# Patient Record
Sex: FEMALE | Race: WHITE
[De-identification: names, ages, dates, MRNs, and addresses within clinical notes are randomized per-mention and may not be internally consistent; named-entity substitution may affect disease eponyms.]

---

## 2018-07-15 ENCOUNTER — HOSPITAL ENCOUNTER (EMERGENCY)
Dept: HOSPITAL 53 - M ED | Age: 23
Discharge: HOME | End: 2018-07-15
Payer: COMMERCIAL

## 2018-07-15 DIAGNOSIS — O9A.212: Primary | ICD-10-CM

## 2018-07-15 DIAGNOSIS — S91.332A: ICD-10-CM

## 2018-07-15 DIAGNOSIS — Z3A.25: ICD-10-CM
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
## 2018-07-15 PROCEDURE — 90715 TDAP VACCINE 7 YRS/> IM: CPT

## 2018-07-15 RX ADMIN — TETANUS TOXOID, REDUCED DIPHTHERIA TOXOID AND ACELLULAR PERTUSSIS VACCINE, ADSORBED 1 ML: 5; 2.5; 8; 8; 2.5 SUSPENSION INTRAMUSCULAR at 11:19

## 2018-10-27 ENCOUNTER — HOSPITAL ENCOUNTER (INPATIENT)
Dept: HOSPITAL 53 - M LDO | Age: 23
LOS: 2 days | Discharge: HOME | End: 2018-10-29
Attending: OBSTETRICS & GYNECOLOGY | Admitting: OBSTETRICS & GYNECOLOGY
Payer: COMMERCIAL

## 2018-10-27 DIAGNOSIS — O48.0: Primary | ICD-10-CM

## 2018-10-27 DIAGNOSIS — Z3A.40: ICD-10-CM

## 2018-10-27 LAB
HEMATOCRIT: 40.5 % (ref 36–47)
HEMOGLOBIN: 13.5 G/DL (ref 12–15.5)
MEAN CORPUSCULAR HEMOGLOBIN: 30.3 PG (ref 27–33)
MEAN CORPUSCULAR HGB CONC: 33.3 G/DL (ref 32–36.5)
MEAN CORPUSCULAR VOLUME: 90.8 FL (ref 80–96)
NRBC BLD AUTO-RTO: 0 % (ref 0–0)
PLATELET COUNT, AUTOMATED: 209 10^3/UL (ref 150–450)
RED BLOOD COUNT: 4.46 10^6/UL (ref 4–5.4)
RED CELL DISTRIBUTION WIDTH: 12.9 % (ref 11.5–14.5)
WHITE BLOOD COUNT: 14.2 10^3/UL (ref 4–10)

## 2018-10-28 LAB
CORD GAS ABE A: -12.6
CORD GAS ABE V: -8.1
CORD GAS HCO3 A: 19.3 MEQ/L
CORD GAS HCO3 V: 19.6 MEQ/L
CORD GAS O2 SAT A: 25.6 %
CORD GAS O2 SAT V: 60.8 %
CORD GAS PCO2 A: 69.9 MMHG
CORD GAS PCO2 V: 47.9 MMHG
CORD GAS PH A: 7.06 UNITS
CORD GAS PH V: 7.23 UNITS
CORD GAS PO2 A: 18.3 MMHG
CORD GAS PO2 V: 27 MMHG
CORD GAS SBC A: 13.5 MEQ/L
CORD GAS SBC V: 17.2 MEQ/L
CORD GAS TCO2 A: 21.4 MEQ/L
CORD GAS TCO2 V: 21.1 MEQ/L

## 2018-10-28 PROCEDURE — 0HQ9XZZ REPAIR PERINEUM SKIN, EXTERNAL APPROACH: ICD-10-PCS

## 2018-10-28 PROCEDURE — 10907ZC DRAINAGE OF AMNIOTIC FLUID, THERAPEUTIC FROM PRODUCTS OF CONCEPTION, VIA NATURAL OR ARTIFICIAL OPENING: ICD-10-PCS

## 2018-10-28 RX ADMIN — SODIUM CHLORIDE, POTASSIUM CHLORIDE, SODIUM LACTATE AND CALCIUM CHLORIDE 1 ML: 600; 310; 30; 20 INJECTION, SOLUTION INTRAVENOUS at 00:58

## 2018-10-28 RX ADMIN — SODIUM CHLORIDE, POTASSIUM CHLORIDE, SODIUM LACTATE AND CALCIUM CHLORIDE 1 MLS/HR: 600; 310; 30; 20 INJECTION, SOLUTION INTRAVENOUS at 00:58

## 2018-10-28 RX ADMIN — Medication 1 TAB: at 09:27

## 2018-10-28 RX ADMIN — Medication 1 MG: at 00:54

## 2018-10-28 RX ADMIN — ACETAMINOPHEN 1 MG: 500 TABLET ORAL at 16:46

## 2018-10-29 LAB
HEMATOCRIT: 31.1 % (ref 36–47)
HEMOGLOBIN: 10.2 G/DL (ref 12–15.5)
MEAN CORPUSCULAR HEMOGLOBIN: 30.3 PG (ref 27–33)
MEAN CORPUSCULAR HGB CONC: 32.8 G/DL (ref 32–36.5)
MEAN CORPUSCULAR VOLUME: 92.3 FL (ref 80–96)
NRBC BLD AUTO-RTO: 0 % (ref 0–0)
PLATELET COUNT, AUTOMATED: 123 10^3/UL (ref 150–450)
RED BLOOD COUNT: 3.37 10^6/UL (ref 4–5.4)
RED CELL DISTRIBUTION WIDTH: 13.2 % (ref 11.5–14.5)
SYPHILIS: NONREACTIVE
WHITE BLOOD COUNT: 11.9 10^3/UL (ref 4–10)

## 2018-10-29 RX ADMIN — HUMAN RHO(D) IMMUNE GLOBULIN 1 MCG: 300 INJECTION, SOLUTION INTRAMUSCULAR at 09:18

## 2018-10-29 RX ADMIN — Medication 1 TAB: at 09:18

## 2018-10-29 RX ADMIN — MEASLES, MUMPS, AND RUBELLA VIRUS VACCINE LIVE 1 ML: 1000; 12500; 1000 INJECTION, POWDER, LYOPHILIZED, FOR SUSPENSION SUBCUTANEOUS at 09:19

## 2018-10-29 RX ADMIN — IBUPROFEN 1 MG: 800 TABLET, FILM COATED ORAL at 01:09

## 2018-10-29 RX ADMIN — IBUPROFEN 1 MG: 800 TABLET, FILM COATED ORAL at 11:18

## 2019-09-27 ENCOUNTER — HOSPITAL ENCOUNTER (OUTPATIENT)
Dept: HOSPITAL 53 - M LAB REF | Age: 24
End: 2019-09-27
Attending: UROLOGY
Payer: COMMERCIAL

## 2019-09-27 DIAGNOSIS — N39.46: ICD-10-CM

## 2019-09-27 DIAGNOSIS — N32.81: Primary | ICD-10-CM

## 2019-09-27 DIAGNOSIS — R39.15: ICD-10-CM

## 2019-09-27 LAB
APPEARANCE UR: (no result)
BACTERIA UR QL AUTO: NEGATIVE
BILIRUB UR QL STRIP.AUTO: NEGATIVE
CAOX CRY URNS QL MICRO: (no result)
GLUCOSE UR QL STRIP.AUTO: NEGATIVE MG/DL
HGB UR QL STRIP.AUTO: NEGATIVE
KETONES UR QL STRIP.AUTO: (no result) MG/DL
LEUKOCYTE ESTERASE UR QL STRIP.AUTO: NEGATIVE
MUCOUS THREADS URNS QL MICRO: (no result)
NITRITE UR QL STRIP.AUTO: NEGATIVE
PH UR STRIP.AUTO: 5 UNITS (ref 5–9)
PROT UR QL STRIP.AUTO: NEGATIVE MG/DL
RBC # UR AUTO: 2 /HPF (ref 0–3)
SP GR UR STRIP.AUTO: 1.02 (ref 1–1.03)
SQUAMOUS #/AREA URNS AUTO: 3 /HPF (ref 0–6)
UROBILINOGEN UR QL STRIP.AUTO: 0.2 MG/DL (ref 0–2)
WBC #/AREA URNS AUTO: 0 /HPF (ref 0–3)

## 2020-03-06 ENCOUNTER — HOSPITAL ENCOUNTER (OUTPATIENT)
Dept: HOSPITAL 53 - M LABDRAW1 | Age: 25
End: 2020-03-06
Attending: PHYSICAL MEDICINE & REHABILITATION
Payer: COMMERCIAL

## 2020-03-06 DIAGNOSIS — Z01.812: Primary | ICD-10-CM

## 2020-03-06 DIAGNOSIS — M43.16: ICD-10-CM

## 2020-03-06 LAB
APTT BLD: 27.8 SECONDS (ref 25–38.4)
B-HCG SERPL QL: NEGATIVE
BASOPHILS # BLD AUTO: 0.1 10^3/UL (ref 0–0.2)
BASOPHILS NFR BLD AUTO: 0.6 % (ref 0–1)
CRP SERPL-MCNC: < 0.3 MG/DL (ref 0–0.3)
EOSINOPHIL # BLD AUTO: 0 10^3/UL (ref 0–0.5)
EOSINOPHIL NFR BLD AUTO: 0.4 % (ref 0–3)
ERYTHROCYTE [SEDIMENTATION RATE] IN BLOOD BY WESTERGREN METHOD: 2 MM/HR (ref 0–20)
HCT VFR BLD AUTO: 43.1 % (ref 36–47)
HGB BLD-MCNC: 14 G/DL (ref 12–15.5)
INR PPP: 0.98
LYMPHOCYTES # BLD AUTO: 2 10^3/UL (ref 1.5–5)
LYMPHOCYTES NFR BLD AUTO: 25.6 % (ref 24–44)
MCH RBC QN AUTO: 29.4 PG (ref 27–33)
MCHC RBC AUTO-ENTMCNC: 32.5 G/DL (ref 32–36.5)
MCV RBC AUTO: 90.4 FL (ref 80–96)
MONOCYTES # BLD AUTO: 0.5 10^3/UL (ref 0–0.8)
MONOCYTES NFR BLD AUTO: 6 % (ref 0–5)
NEUTROPHILS # BLD AUTO: 5.3 10^3/UL (ref 1.5–8.5)
NEUTROPHILS NFR BLD AUTO: 67.1 % (ref 36–66)
PLATELET # BLD AUTO: 279 10^3/UL (ref 150–450)
PROTHROMBIN TIME: 12.7 SECONDS (ref 11.8–14)
RBC # BLD AUTO: 4.77 10^6/UL (ref 4–5.4)
WBC # BLD AUTO: 7.9 10^3/UL (ref 4–10)

## 2020-09-04 ENCOUNTER — HOSPITAL ENCOUNTER (EMERGENCY)
Dept: HOSPITAL 53 - M ED | Age: 25
Discharge: HOME | End: 2020-09-04
Payer: COMMERCIAL

## 2020-09-04 VITALS — WEIGHT: 170.64 LBS | HEIGHT: 64 IN | BODY MASS INDEX: 29.13 KG/M2

## 2020-09-04 VITALS — DIASTOLIC BLOOD PRESSURE: 70 MMHG | SYSTOLIC BLOOD PRESSURE: 128 MMHG

## 2020-09-04 DIAGNOSIS — E28.2: ICD-10-CM

## 2020-09-04 DIAGNOSIS — N83.01: Primary | ICD-10-CM

## 2020-09-04 LAB
ALBUMIN SERPL BCG-MCNC: 4.1 GM/DL (ref 3.2–5.2)
ALT SERPL W P-5'-P-CCNC: 39 U/L (ref 12–78)
BASOPHILS # BLD AUTO: 0.1 10^3/UL (ref 0–0.2)
BASOPHILS NFR BLD AUTO: 0.6 % (ref 0–1)
BILIRUB CONJ SERPL-MCNC: 0.2 MG/DL (ref 0–0.2)
BILIRUB SERPL-MCNC: 0.6 MG/DL (ref 0.2–1)
BUN SERPL-MCNC: 14 MG/DL (ref 7–18)
CALCIUM SERPL-MCNC: 9.1 MG/DL (ref 8.5–10.1)
CHLORIDE SERPL-SCNC: 108 MEQ/L (ref 98–107)
CO2 SERPL-SCNC: 27 MEQ/L (ref 21–32)
CREAT SERPL-MCNC: 0.8 MG/DL (ref 0.55–1.3)
EOSINOPHIL # BLD AUTO: 0.1 10^3/UL (ref 0–0.5)
EOSINOPHIL NFR BLD AUTO: 1.6 % (ref 0–3)
GFR SERPL CREATININE-BSD FRML MDRD: > 60 ML/MIN/{1.73_M2} (ref 60–?)
GLUCOSE SERPL-MCNC: 89 MG/DL (ref 70–100)
HCT VFR BLD AUTO: 41.9 % (ref 36–47)
HGB BLD-MCNC: 13.7 G/DL (ref 12–15.5)
LIPASE SERPL-CCNC: 123 U/L (ref 73–393)
LYMPHOCYTES # BLD AUTO: 2.2 10^3/UL (ref 1.5–5)
LYMPHOCYTES NFR BLD AUTO: 26.5 % (ref 24–44)
MCH RBC QN AUTO: 30.6 PG (ref 27–33)
MCHC RBC AUTO-ENTMCNC: 32.7 G/DL (ref 32–36.5)
MCV RBC AUTO: 93.5 FL (ref 80–96)
MONOCYTES # BLD AUTO: 0.6 10^3/UL (ref 0–0.8)
MONOCYTES NFR BLD AUTO: 6.8 % (ref 0–5)
NEUTROPHILS # BLD AUTO: 5.3 10^3/UL (ref 1.5–8.5)
NEUTROPHILS NFR BLD AUTO: 64.3 % (ref 36–66)
PLATELET # BLD AUTO: 257 10^3/UL (ref 150–450)
POTASSIUM SERPL-SCNC: 4.5 MEQ/L (ref 3.5–5.1)
PROT SERPL-MCNC: 7.1 GM/DL (ref 6.4–8.2)
RBC # BLD AUTO: 4.48 10^6/UL (ref 4–5.4)
SODIUM SERPL-SCNC: 139 MEQ/L (ref 136–145)
WBC # BLD AUTO: 8.2 10^3/UL (ref 4–10)

## 2020-09-04 PROCEDURE — 36415 COLL VENOUS BLD VENIPUNCTURE: CPT

## 2020-09-04 PROCEDURE — 99284 EMERGENCY DEPT VISIT MOD MDM: CPT

## 2020-09-04 PROCEDURE — 83690 ASSAY OF LIPASE: CPT

## 2020-09-04 PROCEDURE — 85025 COMPLETE CBC W/AUTO DIFF WBC: CPT

## 2020-09-04 PROCEDURE — 81001 URINALYSIS AUTO W/SCOPE: CPT

## 2020-09-04 PROCEDURE — 84702 CHORIONIC GONADOTROPIN TEST: CPT

## 2020-09-04 PROCEDURE — 93976 VASCULAR STUDY: CPT

## 2020-09-04 PROCEDURE — 80076 HEPATIC FUNCTION PANEL: CPT

## 2020-09-04 PROCEDURE — 76856 US EXAM PELVIC COMPLETE: CPT

## 2020-09-04 PROCEDURE — 80048 BASIC METABOLIC PNL TOTAL CA: CPT

## 2020-09-04 NOTE — REPVR
PROCEDURE INFORMATION: 

Exam: US Nonobstetric Pelvis; Complete 

Exam date and time: 9/4/2020 9:12 PM 

Age: 25 years old 

Clinical indication: Pelvic pain; Additional info: HX of ovarian cyst lower abd 

pain 



TECHNIQUE: 

Imaging protocol: Transabdominal pelvic nonobstetric ultrasound. Complete exam. 

Real time ultrasound with image documentation. 



COMPARISON: 

No relevant prior studies available. 



FINDINGS: 

Uterus/cervix: The uterus measures 7.1 cm in its cephalocaudad dimension and 

3.7 x 4.5 cm in its AP and lateral dimensions. The endometrium measures 6-7 mm. 

Right adnexa: The right ovary measures 4.2 x 3.0 x 4.2 cm and demonstrates a 

simple appearing cyst measuring 3.4 x 2.6 x 3.8 cm. There is right ovarian 

blood flow. 

Left adnexa: The left ovary measures 2.2 x 3.6 x 1.5 cm and demonstrates normal 

blood flow. 

Intraperitoneal space: None. 

Bladder: Normal. 



IMPRESSION: 

1. Right ovarian simple cyst measuring 3.4 x 2.6 x 3.8 cm. 

2. Otherwise negative pelvic sonogram. 



Electronically signed by: Stephen Irziarry On 09/04/2020  21:29:57 PM

## 2021-05-26 ENCOUNTER — HOSPITAL ENCOUNTER (OUTPATIENT)
Dept: HOSPITAL 53 - M OPP | Age: 26
Discharge: HOME | End: 2021-05-26
Attending: INTERNAL MEDICINE
Payer: COMMERCIAL

## 2021-05-26 VITALS — WEIGHT: 170.8 LBS | BODY MASS INDEX: 29.16 KG/M2 | HEIGHT: 64 IN

## 2021-05-26 VITALS — SYSTOLIC BLOOD PRESSURE: 137 MMHG | DIASTOLIC BLOOD PRESSURE: 86 MMHG

## 2021-05-26 DIAGNOSIS — R10.30: ICD-10-CM

## 2021-05-26 DIAGNOSIS — Z80.0: ICD-10-CM

## 2021-05-26 DIAGNOSIS — K64.0: ICD-10-CM

## 2021-05-26 DIAGNOSIS — Z91.018: ICD-10-CM

## 2021-05-26 DIAGNOSIS — Z86.010: ICD-10-CM

## 2021-05-26 DIAGNOSIS — K57.30: ICD-10-CM

## 2021-05-26 DIAGNOSIS — K63.5: Primary | ICD-10-CM

## 2021-05-26 DIAGNOSIS — R19.7: ICD-10-CM

## 2021-05-26 DIAGNOSIS — R12: ICD-10-CM

## 2021-05-26 DIAGNOSIS — Z83.71: ICD-10-CM

## 2021-05-26 PROCEDURE — 88304 TISSUE EXAM BY PATHOLOGIST: CPT

## 2021-05-26 PROCEDURE — 45380 COLONOSCOPY AND BIOPSY: CPT

## 2021-05-26 PROCEDURE — 88305 TISSUE EXAM BY PATHOLOGIST: CPT

## 2021-05-26 PROCEDURE — 43239 EGD BIOPSY SINGLE/MULTIPLE: CPT

## 2021-05-26 NOTE — ROOR
________________________________________________________________________________

Patient Name: Brant Soliman         Procedure Date: 5/26/2021 11:15 AM

MRN: [id number]                          Account Number: [id number]

YOB: 1995                Age: 26

Room: [facility]                            Gender: Female

Note Status: Finalized                 

________________________________________________________________________________

 

Procedure:            Upper Endoscopy + Biopsies

Indications:          Diarrhea

Providers:            Az Polk MD

Referring MD:         YVAN LIRIANO MD

Requesting Provider:  

Medicines:            Monitored Anesthesia Care

Complications:        No immediate complications.

________________________________________________________________________________

Procedure:            Pre-Anesthesia Assessment:

                      - The heart rate, respiratory rate, oxygen saturations, 

                      blood pressure, adequacy of pulmonary ventilation, and 

                      response to care were monitored throughout the procedure.

                      The Endoscope was introduced through the mouth, and 

                      advanced to the second part of duodenum. The upper GI 

                      endoscopy was accomplished without difficulty. The 

                      patient tolerated the procedure well.

                                                                                

Findings:

     The Z-line was regular and was found 38 cm from the incisors.

     No other significant abnormalities were identified in a careful 

     examination of the stomach.

     Biopsies were taken with a cold forceps in the gastric antrum for 

     Helicobacter pylori testing.

     The exam of the duodenum was otherwise normal.

     Biopsies for histology were taken with a cold forceps in the first 

     portion of the duodenum for evaluation of celiac disease.

     The exam was otherwise without abnormality.

                                                                                

Impression:           - Z-line regular, 38 cm from the incisors.

                      - The examination was otherwise normal.

                      - Biopsies were taken with a cold forceps for 

                      Helicobacter pylori testing.

                      - Biopsies were taken with a cold forceps for evaluation 

                      of celiac disease.

                      - The examination was otherwise normal.

Recommendation:       - Patient has a contact number available for 

                      emergencies. The signs and symptoms of potential delayed 

                      complications were discussed with the patient. Return to 

                      normal activities tomorrow. Written discharge 

                      instructions were provided to the patient.

                      - High fiber diet.

                      - Discharge patient to home.

                      - Continue present medications.

                      - Await pathology results.

                      - Telephone GI clinic for pathology results in 1 week.

                      - The findings and recommendations were discussed with 

                      the patient's family.

                                                                                

Procedure Code(s):    --- Professional ---

                      68077, Esophagogastroduodenoscopy, flexible, transoral; 

                      with biopsy, single or multiple

Diagnosis Code(s):    --- Professional ---

                      R19.7, Diarrhea, unspecified

 

CPT copyright 2019 [employer]. All rights reserved.

 

The codes documented in this report are preliminary and upon  review may 

be revised to meet current compliance requirements.

 

Az Polk MD

____________________

Az Polk MD

5/26/2021 11:31:30 AM

Electronically signed by Az Polk MD

Number of Addenda: 0

 

Note Initiated On: 5/26/2021 11:15 AM

Estimated Blood Loss: Estimated blood loss: none.

## 2021-05-26 NOTE — ROOR
________________________________________________________________________________

Patient Name: Brant Soliman         Procedure Date: 5/26/2021 11:17 AM

MRN: [id number]                          Account Number: [id number]

YOB: 1995                Age: 26

Room: [facility]                            Gender: Female

Note Status: Finalized                 

________________________________________________________________________________

 

Procedure:            Total Colonoscopy to Cecum + ileoscopy + Bx

Indications:          Lower abdominal pain, Clinically significant diarrhea of 

                      unexplained origin

Providers:            Az Polk MD

Referring MD:         YVAN LIRIANO MD

Requesting Provider:  

Medicines:            Monitored Anesthesia Care

Complications:        No immediate complications.

________________________________________________________________________________

Procedure:            Pre-Anesthesia Assessment:

                      - The heart rate, respiratory rate, oxygen saturations, 

                      blood pressure, adequacy of pulmonary ventilation, and 

                      response to care were monitored throughout the procedure.

                      The Colonoscope was introduced through the anus and 

                      advanced to the terminal ileum, with identification of 

                      the appendiceal orifice and IC valve. The colonoscopy 

                      was performed without difficulty. The patient tolerated 

                      the procedure well. The quality of the bowel preparation 

                      was excellent.

                                                                                

Findings:

     The perianal and digital rectal examinations were normal.

     Non-bleeding internal hemorrhoids were found during retroflexion. The 

     hemorrhoids were small and Grade I (internal hemorrhoids that do not 

     prolapse).

     Skin tags were found on perianal exam.

     Biopsies were taken with a cold forceps at the anus for histology.

     No other significant abnormalities were identified in a careful 

     examination of the remainder of the colon.

     The exam was otherwise without abnormality on direct and retroflexion 

     views.

     The terminal ileum appeared normal.

     Biopsies for histology were taken with a cold forceps from the ascending 

     colon, transverse colon, descending colon and rectosigmoid colon for 

     evaluation of microscopic colitis.

     The exam was otherwise without abnormality.

                                                                                

Impression:           - Non-bleeding internal hemorrhoids.

                      - Perianal skin tags found on perianal exam.

                      - The examination was otherwise normal on direct and 

                      retroflexion views.

                      - The examined portion of the ileum was normal.

                      - The examination was otherwise normal.

                      - Biopsies were taken with a cold forceps for histology 

                      at the anus.

                      - Biopsies were taken with a cold forceps from the 

                      ascending colon, transverse colon, descending colon and 

                      rectosigmoid colon for evaluation of microscopic colitis.

                      - The exam was otherwise normal to the cecum.

Recommendation:       - Patient has a contact number available for 

                      emergencies. The signs and symptoms of potential delayed 

                      complications were discussed with the patient. Return to 

                      normal activities tomorrow. Written discharge 

                      instructions were provided to the patient.

                      - High fiber diet.

                      - Discharge patient to home.

                      - Continue present medications.

                      - Await pathology results.

                      - Telephone GI clinic for pathology results in 1 week.

                      - Repeat colonoscopy at age 49 for screening purposes.

                      - Return to referring physician.

                      - The findings and recommendations were discussed with 

                      the patient's family.

                                                                                

Procedure Code(s):    --- Professional ---

                      57394, Colonoscopy, flexible; with biopsy, single or 

                      multiple

Diagnosis Code(s):    --- Professional ---

                      K64.0, First degree hemorrhoids

                      K64.4, Residual hemorrhoidal skin tags

                      R10.30, Lower abdominal pain, unspecified

                      R19.7, Diarrhea, unspecified

 

CPT copyright 2019 [employer]. All rights reserved.

 

The codes documented in this report are preliminary and upon  review may 

be revised to meet current compliance requirements.

 

Az Polk MD

____________________

Az Polk MD

5/26/2021 11:54:30 AM

Electronically signed by Az Polk MD

Number of Addenda: 0

 

Note Initiated On: 5/26/2021 11:17 AM

Estimated Blood Loss: Estimated blood loss: none.